# Patient Record
Sex: MALE | Race: BLACK OR AFRICAN AMERICAN | NOT HISPANIC OR LATINO | Employment: FULL TIME | URBAN - METROPOLITAN AREA
[De-identification: names, ages, dates, MRNs, and addresses within clinical notes are randomized per-mention and may not be internally consistent; named-entity substitution may affect disease eponyms.]

---

## 2021-07-29 DIAGNOSIS — U07.1 COVID-19 VIRUS DETECTED: ICD-10-CM

## 2021-11-19 PROBLEM — M21.42 ACQUIRED PES PLANUS OF BOTH FEET: Status: ACTIVE | Noted: 2021-09-27

## 2021-11-19 PROBLEM — M19.90 ARTHRITIS: Status: ACTIVE | Noted: 2021-09-27

## 2021-11-19 PROBLEM — M21.41 ACQUIRED PES PLANUS OF BOTH FEET: Status: ACTIVE | Noted: 2021-09-27

## 2022-01-07 PROBLEM — M79.671 PAIN IN RIGHT FOOT: Status: ACTIVE | Noted: 2021-09-27

## 2022-01-07 PROBLEM — L84 FOOT CALLUS: Status: ACTIVE | Noted: 2021-09-27

## 2022-01-07 PROBLEM — M25.461 KNEE EFFUSION, RIGHT: Status: ACTIVE | Noted: 2022-01-07

## 2022-01-07 PROBLEM — G89.29 CHRONIC PAIN OF RIGHT KNEE: Status: ACTIVE | Noted: 2022-01-07

## 2022-01-07 PROBLEM — R52 PAIN AGGRAVATED BY WALKING: Status: ACTIVE | Noted: 2022-01-07

## 2022-01-07 PROBLEM — M25.361 RIGHT KNEE GIVES WAY: Status: ACTIVE | Noted: 2022-01-07

## 2022-01-07 PROBLEM — M20.40 HAMMER TOE: Status: ACTIVE | Noted: 2021-09-27

## 2022-01-07 PROBLEM — A60.02 HERPES GENITALIS IN MEN: Status: ACTIVE | Noted: 2022-01-07

## 2022-01-07 PROBLEM — M25.561 CHRONIC PAIN OF RIGHT KNEE: Status: ACTIVE | Noted: 2022-01-07

## 2022-01-07 PROBLEM — N48.89 PENILE PAIN: Status: ACTIVE | Noted: 2022-01-07

## 2024-05-03 ENCOUNTER — PATIENT OUTREACH (OUTPATIENT)
Dept: ADMINISTRATIVE | Facility: HOSPITAL | Age: 54
End: 2024-05-03

## 2024-05-03 ENCOUNTER — PATIENT MESSAGE (OUTPATIENT)
Dept: ADMINISTRATIVE | Facility: HOSPITAL | Age: 54
End: 2024-05-03

## 2024-12-05 ENCOUNTER — PATIENT OUTREACH (OUTPATIENT)
Dept: ADMINISTRATIVE | Facility: OTHER | Age: 54
End: 2024-12-05

## 2024-12-05 NOTE — PROGRESS NOTES
CHW - Initial Contact    This Community Health Worker completed the Social Determinant of Health questionnaire with patient during clinic visit today.    Pt identified barriers of most importance are: patient identified no barriers of importance during clinic visit    Referrals to community agencies completed with patient consent outside of [facility] include: No community referral needed   During clinic visit   Referrals were put through [facility] - no:   Support and Services: No support services needed during clinic visit   Other information discussed the patient needs help with: patient discussed no other information during clinic visit    Follow up required: yes  Follow-up Outreach - Due: 12/19/2024

## 2025-02-17 ENCOUNTER — PATIENT OUTREACH (OUTPATIENT)
Dept: ADMINISTRATIVE | Facility: OTHER | Age: 55
End: 2025-02-17

## 2025-02-17 NOTE — PROGRESS NOTES
CHW - Follow Up    This Community Health Worker completed a follow up visit with patient via telephone today.  Pt reported: patient reported he is doing good no assistance is needed during follow up phone interview  Patient will reach out if assistance is needed in the future. Patient requested sooner appointment with   Provider. Appointment  was schedule.  Community Health Worker provided: patient reported he is doing good   Follow up required: no  Case Closure - Due: 2/17/2025             CHW - Case Closure    This Community Health Worker spoke to patient during clinic visit today.   Pt reported:  patient reported he is doing good no assistance is needed during follow up phone interview  Patient will reach out if assistance is needed in the future. Patient requested sooner appointment with   Provider. Appointment  was schedule.  Pt denied any additional needs at this time and agrees with episode closure at this time.    Provided patient with Community Health Worker's contact information and encouraged   him to contact this Community Health Worker if additional needs arise.